# Patient Record
Sex: MALE | Race: WHITE | Employment: UNEMPLOYED | URBAN - METROPOLITAN AREA
[De-identification: names, ages, dates, MRNs, and addresses within clinical notes are randomized per-mention and may not be internally consistent; named-entity substitution may affect disease eponyms.]

---

## 2020-07-30 ENCOUNTER — HOSPITAL ENCOUNTER (EMERGENCY)
Facility: CLINIC | Age: 25
Discharge: HOME | End: 2020-07-30
Attending: EMERGENCY MEDICINE
Payer: COMMERCIAL

## 2020-07-30 ENCOUNTER — APPOINTMENT (OUTPATIENT)
Dept: CT IMAGING | Facility: CLINIC | Age: 25
End: 2020-07-30
Payer: COMMERCIAL

## 2020-07-30 VITALS
HEART RATE: 98 BPM | TEMPERATURE: 98.4 F | OXYGEN SATURATION: 98 % | SYSTOLIC BLOOD PRESSURE: 116 MMHG | RESPIRATION RATE: 18 BRPM | DIASTOLIC BLOOD PRESSURE: 68 MMHG

## 2020-07-30 LAB
-: ABNORMAL
-: NORMAL
ABSOLUTE EOS #: 0.2 K/UL (ref 0–0.4)
ABSOLUTE IMMATURE GRANULOCYTE: ABNORMAL K/UL (ref 0–0.3)
ABSOLUTE LYMPH #: 1.5 K/UL (ref 1–4.8)
ABSOLUTE MONO #: 0.5 K/UL (ref 0.1–1.2)
ACETAMINOPHEN LEVEL: <5 UG/ML (ref 10–30)
ALBUMIN SERPL-MCNC: 4.7 G/DL (ref 3.5–5.2)
ALBUMIN/GLOBULIN RATIO: 1.3 (ref 1–2.5)
ALP BLD-CCNC: 67 U/L (ref 40–129)
ALT SERPL-CCNC: 142 U/L (ref 5–41)
AMORPHOUS: ABNORMAL
AMPHETAMINE SCREEN URINE: NEGATIVE
ANION GAP SERPL CALCULATED.3IONS-SCNC: 20 MMOL/L (ref 9–17)
AST SERPL-CCNC: 74 U/L
BACTERIA: ABNORMAL
BARBITURATE SCREEN URINE: NEGATIVE
BASOPHILS # BLD: 1 % (ref 0–2)
BASOPHILS ABSOLUTE: 0.1 K/UL (ref 0–0.2)
BENZODIAZEPINE SCREEN, URINE: NEGATIVE
BILIRUB SERPL-MCNC: 0.5 MG/DL (ref 0.3–1.2)
BILIRUBIN DIRECT: 0.13 MG/DL
BILIRUBIN URINE: NEGATIVE
BILIRUBIN, INDIRECT: 0.37 MG/DL (ref 0–1)
BUN BLDV-MCNC: 9 MG/DL (ref 6–20)
BUN/CREAT BLD: ABNORMAL (ref 9–20)
BUPRENORPHINE URINE: NORMAL
CALCIUM SERPL-MCNC: 9.5 MG/DL (ref 8.6–10.4)
CANNABINOID SCREEN URINE: NEGATIVE
CASTS UA: ABNORMAL /LPF (ref 0–2)
CASTS UA: ABNORMAL /LPF (ref 0–2)
CHLORIDE BLD-SCNC: 98 MMOL/L (ref 98–107)
CK MB: 3 NG/ML
CO2: 19 MMOL/L (ref 20–31)
COCAINE METABOLITE, URINE: NEGATIVE
COLOR: YELLOW
COMMENT UA: ABNORMAL
CREAT SERPL-MCNC: 1.1 MG/DL (ref 0.7–1.2)
CRYSTALS, UA: ABNORMAL /HPF
DIFFERENTIAL TYPE: ABNORMAL
EOSINOPHILS RELATIVE PERCENT: 4 % (ref 1–4)
EPITHELIAL CELLS UA: ABNORMAL /HPF (ref 0–5)
ETHANOL PERCENT: <0.01 %
ETHANOL: <10 MG/DL
GFR AFRICAN AMERICAN: >60 ML/MIN
GFR NON-AFRICAN AMERICAN: >60 ML/MIN
GFR SERPL CREATININE-BSD FRML MDRD: ABNORMAL ML/MIN/{1.73_M2}
GFR SERPL CREATININE-BSD FRML MDRD: ABNORMAL ML/MIN/{1.73_M2}
GLOBULIN: ABNORMAL G/DL (ref 1.5–3.8)
GLUCOSE BLD-MCNC: 116 MG/DL (ref 70–99)
GLUCOSE URINE: NEGATIVE
HCT VFR BLD CALC: 47.8 % (ref 41–53)
HEMOGLOBIN: 15.8 G/DL (ref 13.5–17.5)
IMMATURE GRANULOCYTES: ABNORMAL %
KETONES, URINE: NEGATIVE
LACTIC ACID: 2.1 MMOL/L (ref 0.5–2.2)
LEUKOCYTE ESTERASE, URINE: NEGATIVE
LYMPHOCYTES # BLD: 22 % (ref 24–44)
MAGNESIUM: 2.3 MG/DL (ref 1.6–2.6)
MCH RBC QN AUTO: 30.2 PG (ref 26–34)
MCHC RBC AUTO-ENTMCNC: 33 G/DL (ref 31–37)
MCV RBC AUTO: 91.5 FL (ref 80–100)
MDMA URINE: NORMAL
METHADONE SCREEN, URINE: NEGATIVE
METHAMPHETAMINE, URINE: NORMAL
MONOCYTES # BLD: 8 % (ref 2–11)
MUCUS: ABNORMAL
MYOGLOBIN: 73 NG/ML (ref 28–72)
NITRITE, URINE: NEGATIVE
NRBC AUTOMATED: ABNORMAL PER 100 WBC
OPIATES, URINE: NEGATIVE
OTHER OBSERVATIONS UA: ABNORMAL
OXYCODONE SCREEN URINE: NEGATIVE
PDW BLD-RTO: 13.8 % (ref 12.5–15.4)
PH UA: 6.5 (ref 5–8)
PHENCYCLIDINE, URINE: NEGATIVE
PLATELET # BLD: 334 K/UL (ref 140–450)
PLATELET ESTIMATE: ABNORMAL
PMV BLD AUTO: 7.8 FL (ref 6–12)
POTASSIUM SERPL-SCNC: 4.5 MMOL/L (ref 3.7–5.3)
PROPOXYPHENE, URINE: NORMAL
PROTEIN UA: ABNORMAL
RBC # BLD: 5.23 M/UL (ref 4.5–5.9)
RBC # BLD: ABNORMAL 10*6/UL
RBC UA: ABNORMAL /HPF (ref 0–2)
REASON FOR REJECTION: NORMAL
RENAL EPITHELIAL, UA: ABNORMAL /HPF
SALICYLATE LEVEL: <1 MG/DL (ref 3–10)
SEG NEUTROPHILS: 65 % (ref 36–66)
SEGMENTED NEUTROPHILS ABSOLUTE COUNT: 4.3 K/UL (ref 1.8–7.7)
SODIUM BLD-SCNC: 137 MMOL/L (ref 135–144)
SPECIFIC GRAVITY UA: 1.02 (ref 1–1.03)
TEST INFORMATION: NORMAL
TOTAL CK: 225 U/L (ref 39–308)
TOTAL PROTEIN: 8.3 G/DL (ref 6.4–8.3)
TOXIC TRICYCLIC SC,BLOOD: NEGATIVE
TRICHOMONAS: ABNORMAL
TRICYCLIC ANTIDEPRESSANTS, UR: NORMAL
TROPONIN INTERP: NORMAL
TROPONIN T: NORMAL NG/ML
TROPONIN, HIGH SENSITIVITY: <6 NG/L (ref 0–22)
TURBIDITY: CLEAR
URINE HGB: NEGATIVE
UROBILINOGEN, URINE: NORMAL
WBC # BLD: 6.6 K/UL (ref 3.5–11)
WBC # BLD: ABNORMAL 10*3/UL
WBC UA: ABNORMAL /HPF (ref 0–5)
YEAST: ABNORMAL
ZZ NTE CLEAN UP: ORDERED TEST: NORMAL
ZZ NTE WITH NAME CLEAN UP: SPECIMEN SOURCE: NORMAL

## 2020-07-30 PROCEDURE — 80048 BASIC METABOLIC PNL TOTAL CA: CPT

## 2020-07-30 PROCEDURE — 2580000003 HC RX 258: Performed by: EMERGENCY MEDICINE

## 2020-07-30 PROCEDURE — 80307 DRUG TEST PRSMV CHEM ANLYZR: CPT

## 2020-07-30 PROCEDURE — 99285 EMERGENCY DEPT VISIT HI MDM: CPT

## 2020-07-30 PROCEDURE — 36415 COLL VENOUS BLD VENIPUNCTURE: CPT

## 2020-07-30 PROCEDURE — 84484 ASSAY OF TROPONIN QUANT: CPT

## 2020-07-30 PROCEDURE — 82553 CREATINE MB FRACTION: CPT

## 2020-07-30 PROCEDURE — G0480 DRUG TEST DEF 1-7 CLASSES: HCPCS

## 2020-07-30 PROCEDURE — 82550 ASSAY OF CK (CPK): CPT

## 2020-07-30 PROCEDURE — 80076 HEPATIC FUNCTION PANEL: CPT

## 2020-07-30 PROCEDURE — 93005 ELECTROCARDIOGRAM TRACING: CPT | Performed by: EMERGENCY MEDICINE

## 2020-07-30 PROCEDURE — 85025 COMPLETE CBC W/AUTO DIFF WBC: CPT

## 2020-07-30 PROCEDURE — 83605 ASSAY OF LACTIC ACID: CPT

## 2020-07-30 PROCEDURE — 83735 ASSAY OF MAGNESIUM: CPT

## 2020-07-30 PROCEDURE — 70450 CT HEAD/BRAIN W/O DYE: CPT

## 2020-07-30 PROCEDURE — 83874 ASSAY OF MYOGLOBIN: CPT

## 2020-07-30 PROCEDURE — 81001 URINALYSIS AUTO W/SCOPE: CPT

## 2020-07-30 RX ORDER — QUETIAPINE FUMARATE 50 MG/1
50 TABLET, EXTENDED RELEASE ORAL NIGHTLY
COMMUNITY

## 2020-07-30 RX ORDER — BUPROPION HYDROCHLORIDE 300 MG/1
300 TABLET ORAL EVERY MORNING
COMMUNITY

## 2020-07-30 RX ORDER — NALTREXONE HYDROCHLORIDE 50 MG/1
50 TABLET, FILM COATED ORAL DAILY
COMMUNITY

## 2020-07-30 RX ORDER — SODIUM CHLORIDE 9 MG/ML
INJECTION, SOLUTION INTRAVENOUS CONTINUOUS
Status: DISCONTINUED | OUTPATIENT
Start: 2020-07-30 | End: 2020-07-30 | Stop reason: HOSPADM

## 2020-07-30 RX ORDER — 0.9 % SODIUM CHLORIDE 0.9 %
1000 INTRAVENOUS SOLUTION INTRAVENOUS ONCE
Status: COMPLETED | OUTPATIENT
Start: 2020-07-30 | End: 2020-07-30

## 2020-07-30 RX ADMIN — SODIUM CHLORIDE 1000 ML: 9 INJECTION, SOLUTION INTRAVENOUS at 09:43

## 2020-07-30 NOTE — ED NOTES
Spoke with [facility] regarding transportation back to facility, they were updated that patient is refusing further treatment and requesting to sign out against medical advice, per the treatment facility they are sending over his therapist to speak with him, pt updated per conversation that patient needs to receive treatment if he is planning to return to [facility] recovery, pt informed and states \"I'm going out to vape\", pt informed that he should remain in room until therapist arrives, patient states \"I will be right back\" and walks out      Rajwinder Mckeon RN  07/30/20 0932

## 2020-07-30 NOTE — ED NOTES
Pt therapist from [address] [place] [address] to bedside to speak with patient regarding plan of care      Shanice Flowers RN  07/30/20 [id number]

## 2020-07-30 NOTE — ED NOTES
Pt agreeable to stay for treatment, pt up ambulatory to restroom to obtain urine specimen     Shanice Flowers RN  07/30/20 9239

## 2020-08-01 LAB
EKG ATRIAL RATE: 106 BPM
EKG P AXIS: 69 DEGREES
EKG P-R INTERVAL: 148 MS
EKG Q-T INTERVAL: 330 MS
EKG QRS DURATION: 84 MS
EKG QTC CALCULATION (BAZETT): 438 MS
EKG R AXIS: 75 DEGREES
EKG T AXIS: 38 DEGREES
EKG VENTRICULAR RATE: 106 BPM

## 2020-08-04 ASSESSMENT — ENCOUNTER SYMPTOMS
SHORTNESS OF BREATH: 0
COUGH: 0

## 2020-08-04 NOTE — ED PROVIDER NOTES
[address] [facility] ED  EMERGENCY DEPARTMENT ENCOUNTER      Pt Name: Davis Iraheta  MRN: [id number]  [place] 1995  Date of evaluation: 7/30/2020  Provider: Dov Merrill MD    CHIEF COMPLAINT     Chief Complaint   Patient presents with    Loss of Consciousness     reports feeling dizzy, sat down and was syncopal for approximately 10 seconds, denies complaints at present time, states \"I don't think I needed to come to the hospital\"         HISTORY OF PRESENT ILLNESS   (Location/Symptom, Timing/Onset, Context/Setting,Quality, Duration, Modifying Factors, Severity)  Note limiting factors. Davis Iraheta is a 22 y.o. male who presents to the emergency department by EMS for evaluation of witnessed seizure at his drug rehabilitation center from which she recovered fairly quickly. Patient does not have any history of seizures. He denies using illicit drugs recently. The history is provided by the patient and a caregiver. Nursing Notes werereviewed. REVIEW OF SYSTEMS    (2-9 systems for level 4, 10 or more for level 5)     Review of Systems   Constitutional: Negative for fever. Respiratory: Negative for cough and shortness of breath. All other systems reviewed and are negative. Except as noted above the remainder of the review of systems was reviewed and negative. PAST MEDICAL HISTORY   History reviewed. No pertinent past medical history. SURGICALHISTORY     History reviewed. No pertinent surgical history.       CURRENT MEDICATIONS       Discharge Medication List as of 7/30/2020 11:11 AM      CONTINUE these medications which have NOT CHANGED    Details   brexpiprazole (REXULTI) 2 MG TABS tablet Take 2 mg by mouth dailyHistorical Med      buPROPion (WELLBUTRIN XL) 300 MG extended release tablet Take 300 mg by mouth every morningHistorical Med      QUEtiapine (SEROQUEL XR) 50 MG extended release tablet Take 50 mg by mouth nightlyHistorical Med      naltrexone (DEPADE) 50 MG tablet Take 50 mg by mouth dailyHistorical Med             ALLERGIES     Patient has no known allergies. FAMILY HISTORY     History reviewed. No pertinent family history. SOCIAL HISTORY       Social History     Socioeconomic History    Marital status: Single     Spouse name: None    Number of children: None    Years of education: None    Highest education level: None   Occupational History    None   Social Needs    Financial resource strain: None    Food insecurity     Worry: None     Inability: None    Transportation needs     Medical: None     Non-medical: None   Tobacco Use    Smoking status: Current Every Day Smoker     Packs/day: 0.50     Types: Cigarettes, E-Cigarettes   Substance and Sexual Activity    Alcohol use: Not Currently    Drug use: Not Currently     Types: IV, Methamphetamines, Opiates     Sexual activity: None   Lifestyle    Physical activity     Days per week: None     Minutes per session: None    Stress: None   Relationships    Social connections     Talks on phone: None     Gets together: None     Attends [employer] service: None     Active member of club or organization: None     Attends meetings of clubs or organizations: None     Relationship status: None    Intimate partner violence     Fear of current or ex partner: None     Emotionally abused: None     Physically abused: None     Forced sexual activity: None   Other Topics Concern    None   Social History Narrative    None       SCREENINGS             PHYSICAL EXAM    (up to 7 for level 4, 8 or more for level 5)     ED Triage Vitals [07/30/20 0924]   BP Temp Temp src Pulse Resp SpO2 Height Weight   116/68 98.4 °F (36.9 °C) -- 125 18 98 % -- --       Physical Exam  Vitals signs reviewed. Constitutional:       General: He is not in acute distress. Appearance: He is not ill-appearing. HENT:      Head: Normocephalic.       Nose: Nose normal.      Mouth/Throat:      Mouth: Mucous membranes are moist.   Eyes: Extraocular Movements: Extraocular movements intact. Pupils: Pupils are equal, round, and reactive to light. Neck:      Musculoskeletal: Neck supple. No muscular tenderness. Cardiovascular:      Rate and Rhythm: Normal rate and regular rhythm. Pulmonary:      Effort: Pulmonary effort is normal.      Breath sounds: Normal breath sounds. No rhonchi or rales. Abdominal:      Palpations: Abdomen is soft. Tenderness: There is no abdominal tenderness. Musculoskeletal: Normal range of motion. General: No swelling, tenderness or signs of injury. Skin:     General: Skin is warm and dry. Coloration: Skin is not pale. Neurological:      General: No focal deficit present. Mental Status: He is alert and oriented to person, place, and time. Mental status is at baseline. Cranial Nerves: No cranial nerve deficit. Motor: No weakness. Coordination: Coordination normal.         DIAGNOSTIC RESULTS     EKG: All EKG's are interpreted by the Emergency Department Physician who either signs orCo-signs this chart in the absence of a cardiologist.    Sinus tachycardia with a heart rate of 106 beats a minute. No acute ischemic changes. RADIOLOGY:   Non-plain film images such as CT, Ultrasound and MRI are read by the radiologist. Plain radiographic images are visualized and preliminarily interpreted by the emergency physician with the below findings:    Interpretation per the Radiologist below, ifavailable at the time of this note:    CT Head WO Contrast   Final Result   Negative noncontrast CT examination of the brain.                ED BEDSIDE ULTRASOUND:   Performed by ED Physician - none    LABS:  Labs Reviewed   CBC WITH AUTO DIFFERENTIAL - Abnormal; Notable for the following components:       Result Value    Lymphocytes 22 (*)     All other components within normal limits   BASIC METABOLIC PANEL - Abnormal; Notable for the following components:    Glucose 116 (*)     CO2 19 (*) Anion Gap 20 (*)     All other components within normal limits   HEPATIC FUNCTION PANEL - Abnormal; Notable for the following components:     (*)     AST 74 (*)     All other components within normal limits   TOX SCR, BLD, ED - Abnormal; Notable for the following components:    Acetaminophen Level <5 (*)     Salicylate Lvl <1 (*)     All other components within normal limits   MYOGLOBIN, SERUM - Abnormal; Notable for the following components:    Myoglobin 73 (*)     All other components within normal limits   URINALYSIS - Abnormal; Notable for the following components:    Protein, UA 1+ (*)     All other components within normal limits   MICROSCOPIC URINALYSIS - Abnormal; Notable for the following components:    Mucus, UA 1+ (*)     Other Observations UA   (*)     Value: Utilizing a urinalysis as the only screening method to exclude a potential uropathogen can be unreliable in many patient populations. Rapid screening tests are less sensitive than culture and if UTI is a clinical possibility, culture should be considered despite a negative urinalysis. All other components within normal limits   MAGNESIUM   TROPONIN   CK-MB   CK   URINE DRUG SCREEN   SPECIMEN REJECTION   LACTIC ACID   PREVIOUS SPECIMEN       All other labs were within normal range ornot returned as of this dictation. EMERGENCY DEPARTMENT COURSE and DIFFERENTIAL DIAGNOSIS/MDM:   Vitals:    Vitals:    07/30/20 0924 07/30/20 1104   BP: 116/68    Pulse: 125 98   Resp: 18 18   Temp: 98.4 °F (36.9 °C)    SpO2: 98% 98%            Urine toxicology screen is negative. CT brain is negative for acute findings. Blood chemistries did not show any significant diagnostic findings. Patient likely had a single seizure. He is advised outpatient neurology follow-up and return anytime if worse. He was discharged in stable condition.     MDM    CONSULTS:  None    PROCEDURES:  Unlessotherwise noted below, none     Procedures    FINAL IMPRESSION 1. Seizure-like activity [facility])          DISPOSITION/PLAN   DISPOSITION Decision To Discharge 07/30/2020 11:10:25 AM      PATIENT REFERRED TO:  MD Verona Ramey, [address]  [phone]            DISCHARGE MEDICATIONS:         Problem List:  There is no problem list on file for this patient. Summation      Patient Course: Discharged. ED Medicationsadministered this visit:    Medications   0.9 % sodium chloride bolus (0 mLs Intravenous Stopped 7/30/20 1120)       New Prescriptions from this visit:    Discharge Medication List as of 7/30/2020 11:11 AM          Follow-up:  MD Verona Ramey, [address]  [phone]              Final Impression:   1.  Seizure-like activity ([address]               (Please note that portions of this note were completed with a voice recognitionprogram.  Efforts were made to edit the dictations but occasionally words are mis-transcribed.)    Blane Ceballos MD (electronically signed)  Attending Emergency Physician            Blane Ceballos MD  08/04/20 4452